# Patient Record
Sex: MALE | Race: WHITE | NOT HISPANIC OR LATINO | Employment: FULL TIME | URBAN - METROPOLITAN AREA
[De-identification: names, ages, dates, MRNs, and addresses within clinical notes are randomized per-mention and may not be internally consistent; named-entity substitution may affect disease eponyms.]

---

## 2017-12-01 ENCOUNTER — OFFICE VISIT - HEALTHEAST (OUTPATIENT)
Dept: FAMILY MEDICINE | Facility: CLINIC | Age: 47
End: 2017-12-01

## 2017-12-01 DIAGNOSIS — J01.90 ACUTE SINUSITIS: ICD-10-CM

## 2017-12-01 DIAGNOSIS — R05.9 COUGH: ICD-10-CM

## 2017-12-01 ASSESSMENT — MIFFLIN-ST. JEOR: SCORE: 1745.3

## 2017-12-03 ENCOUNTER — COMMUNICATION - HEALTHEAST (OUTPATIENT)
Dept: FAMILY MEDICINE | Facility: CLINIC | Age: 47
End: 2017-12-03

## 2019-08-15 ENCOUNTER — OFFICE VISIT - HEALTHEAST (OUTPATIENT)
Dept: FAMILY MEDICINE | Facility: CLINIC | Age: 49
End: 2019-08-15

## 2019-08-15 DIAGNOSIS — Z12.11 SCREEN FOR COLON CANCER: ICD-10-CM

## 2019-08-15 DIAGNOSIS — R22.1 NECK MASS: ICD-10-CM

## 2019-08-15 DIAGNOSIS — R19.5 LOOSE STOOLS: ICD-10-CM

## 2019-08-15 DIAGNOSIS — Z00.00 ROUTINE GENERAL MEDICAL EXAMINATION AT A HEALTH CARE FACILITY: ICD-10-CM

## 2019-08-15 ASSESSMENT — MIFFLIN-ST. JEOR: SCORE: 1758.91

## 2019-08-16 ENCOUNTER — AMBULATORY - HEALTHEAST (OUTPATIENT)
Dept: LAB | Facility: CLINIC | Age: 49
End: 2019-08-16

## 2019-08-16 DIAGNOSIS — Z00.00 ROUTINE GENERAL MEDICAL EXAMINATION AT A HEALTH CARE FACILITY: ICD-10-CM

## 2019-08-16 LAB
CHOLEST SERPL-MCNC: 217 MG/DL
FASTING STATUS PATIENT QL REPORTED: YES
FASTING STATUS PATIENT QL REPORTED: YES
GLUCOSE BLD-MCNC: 85 MG/DL (ref 70–99)
HDLC SERPL-MCNC: 56 MG/DL
LDLC SERPL CALC-MCNC: 144 MG/DL
PSA SERPL-MCNC: 0.4 NG/ML (ref 0–3.5)
TRIGL SERPL-MCNC: 84 MG/DL

## 2019-09-30 ENCOUNTER — RECORDS - HEALTHEAST (OUTPATIENT)
Dept: ADMINISTRATIVE | Facility: OTHER | Age: 49
End: 2019-09-30

## 2019-10-10 ENCOUNTER — RECORDS - HEALTHEAST (OUTPATIENT)
Dept: ADMINISTRATIVE | Facility: OTHER | Age: 49
End: 2019-10-10

## 2019-10-25 ENCOUNTER — RECORDS - HEALTHEAST (OUTPATIENT)
Dept: ADMINISTRATIVE | Facility: OTHER | Age: 49
End: 2019-10-25

## 2020-02-03 ENCOUNTER — OFFICE VISIT - HEALTHEAST (OUTPATIENT)
Dept: FAMILY MEDICINE | Facility: CLINIC | Age: 50
End: 2020-02-03

## 2020-02-03 ENCOUNTER — COMMUNICATION - HEALTHEAST (OUTPATIENT)
Dept: FAMILY MEDICINE | Facility: CLINIC | Age: 50
End: 2020-02-03

## 2020-02-03 ENCOUNTER — COMMUNICATION - HEALTHEAST (OUTPATIENT)
Dept: SCHEDULING | Facility: CLINIC | Age: 50
End: 2020-02-03

## 2020-02-03 DIAGNOSIS — R50.9 FEVER: ICD-10-CM

## 2020-02-03 DIAGNOSIS — J32.9 SINUSITIS, UNSPECIFIED CHRONICITY, UNSPECIFIED LOCATION: ICD-10-CM

## 2020-02-03 LAB
FLUAV AG SPEC QL IA: NORMAL
FLUBV AG SPEC QL IA: NORMAL

## 2020-02-03 ASSESSMENT — MIFFLIN-ST. JEOR: SCORE: 1772.8

## 2020-02-07 ENCOUNTER — COMMUNICATION - HEALTHEAST (OUTPATIENT)
Dept: FAMILY MEDICINE | Facility: CLINIC | Age: 50
End: 2020-02-07

## 2020-10-07 ENCOUNTER — COMMUNICATION - HEALTHEAST (OUTPATIENT)
Dept: FAMILY MEDICINE | Facility: CLINIC | Age: 50
End: 2020-10-07

## 2020-10-07 DIAGNOSIS — B35.1 ONYCHOMYCOSIS: ICD-10-CM

## 2020-10-23 ENCOUNTER — COMMUNICATION - HEALTHEAST (OUTPATIENT)
Dept: FAMILY MEDICINE | Facility: CLINIC | Age: 50
End: 2020-10-23

## 2020-10-23 ENCOUNTER — COMMUNICATION - HEALTHEAST (OUTPATIENT)
Dept: SCHEDULING | Facility: CLINIC | Age: 50
End: 2020-10-23

## 2020-10-26 ENCOUNTER — OFFICE VISIT - HEALTHEAST (OUTPATIENT)
Dept: FAMILY MEDICINE | Facility: CLINIC | Age: 50
End: 2020-10-26

## 2020-10-26 DIAGNOSIS — J02.9 ACUTE PHARYNGITIS, UNSPECIFIED ETIOLOGY: ICD-10-CM

## 2020-10-26 RX ORDER — IBUPROFEN 200 MG
600 TABLET ORAL EVERY 6 HOURS PRN
Status: SHIPPED | COMMUNITY
Start: 2020-10-26

## 2021-05-01 ENCOUNTER — OFFICE VISIT - HEALTHEAST (OUTPATIENT)
Dept: FAMILY MEDICINE | Facility: CLINIC | Age: 51
End: 2021-05-01

## 2021-05-01 DIAGNOSIS — R39.9 UTI SYMPTOMS: ICD-10-CM

## 2021-05-01 DIAGNOSIS — N34.2 URETHRITIS: ICD-10-CM

## 2021-05-01 DIAGNOSIS — R31.29 MICROSCOPIC HEMATURIA: ICD-10-CM

## 2021-05-01 LAB
ALBUMIN UR-MCNC: NEGATIVE G/DL
APPEARANCE UR: CLEAR
BACTERIA #/AREA URNS HPF: ABNORMAL /[HPF]
BILIRUB UR QL STRIP: NEGATIVE
COLOR UR AUTO: YELLOW
GLUCOSE UR STRIP-MCNC: NEGATIVE MG/DL
HGB UR QL STRIP: ABNORMAL
KETONES UR STRIP-MCNC: NEGATIVE MG/DL
LEUKOCYTE ESTERASE UR QL STRIP: NEGATIVE
NITRATE UR QL: NEGATIVE
PH UR STRIP: 7.5 [PH] (ref 5–8)
RBC #/AREA URNS AUTO: ABNORMAL HPF
SP GR UR STRIP: 1.02 (ref 1–1.03)
SQUAMOUS #/AREA URNS AUTO: ABNORMAL LPF
UROBILINOGEN UR STRIP-ACNC: ABNORMAL
WBC #/AREA URNS AUTO: ABNORMAL HPF

## 2021-05-05 ENCOUNTER — COMMUNICATION - HEALTHEAST (OUTPATIENT)
Dept: FAMILY MEDICINE | Facility: CLINIC | Age: 51
End: 2021-05-05

## 2021-05-31 VITALS — BODY MASS INDEX: 28.06 KG/M2 | WEIGHT: 196 LBS | HEIGHT: 70 IN

## 2021-05-31 NOTE — PATIENT INSTRUCTIONS - HE
You can have your fasting appointment tomorrow  Set up your colonoscopy  You can try metamucil daily  You can consider the Shingrix/ shingles vaccine. I recommend checking with insurance for coverage  Follow-up with dermatology to have the neck lesion removed

## 2021-05-31 NOTE — PROGRESS NOTES
Assessment/ Plan     1. Routine general medical examination at a health care facility    Recommend that he remain physically active    Check laboratory testing as noted  - Lipid Cascade; Future  - Glucose; Future  - PSA (Prostatic-Specific Antigen), Annual Screen; Future    Refer for colonoscopy    Recommend he consider the Shingrix/shingles vaccine    2. Screen for colon cancer    Refer for colonoscopy  - Ambulatory referral for Colonoscopy    3. Loose stools-  Likely secondary to irritable bowel syndrome    Recommend increased fiber.  Encourage use of Metamucil  He is setting up his colonoscopy  If having ongoing issues offered referral to gastroenterology    4. Neck mass  Probable cystic structure    Refer to dermatology to have this excised  - Ambulatory referral to Dermatology        Subjective:       Stan Maya is a 49 y.o. male who presents to the clinic for a complete physical examination.  His medical history is generally unremarkable for chronic health conditions.  He turned 49 and therefore made an appointment for an examination.  The past his total cholesterol is 172 with an LDL of 110.  His HDL is 53.    He has had a cystic mass involving the right side of his neck.  This has enlarged recently he would like to have this removed.  This does not cause tenderness.    Review of systems is notable for loose stools.  These typically occur on a consistent basis.  He sometimes feels the need to go back and wipe after he has had a bowel movement.  He denies abdominal pain or bloody stools.  He has not had dark and tarry stools.  There is no family history of inflammatory bowel disease.  He will set up his colonoscopy in the near future.    He drinks a moderate amount of alcohol, 4-6 alcoholic beverages per week on average.  He tolerates physical exertion without difficulty.  Review of systems is negative for headaches,  or hearing concerns, chest pain, urinary concerns, or musculoskeletal issues.  He  "does note that is having some challenges with reading.    The following portions of the patient's history were reviewed and updated as appropriate: allergies, current medications, past family history, past medical history, past social history, past surgical history and problem list. Medications have been reconciled    Review of Systems   A 12 point comprehensive review of systems was negative except as noted.      No current outpatient medications on file.     No current facility-administered medications for this visit.        Objective:      /68   Pulse 60   Temp 98  F (36.7  C)   Resp 16   Ht 5' 10\" (1.778 m)   Wt 199 lb (90.3 kg)   BMI 28.55 kg/m        General appearance: alert, appears stated age and cooperative  Head: Normocephalic, without obvious abnormality, atraumatic  Eyes: conjunctivae/corneas clear. PERRL, EOM's intact.   Ears: normal TM's and external ear canals both ears  Nose: Nares normal. Septum midline. Mucosa normal. No drainage or sinus tenderness.  Throat: lips, mucosa, and tongue normal; teeth and gums normal  Neck: no adenopathy, supple, symmetrical, trachea midline  There is a protuberant subcutaneous mass about the size of a grape involving the right side of his neck  Back: symmetric, no curvature. ROM normal. No CVA tenderness.  Lungs: clear to auscultation bilaterally  Heart: regular rate and rhythm, S1, S2 normal, no murmur, click, rub or gallop  Abdomen: soft, non-tender; bowel sounds normal; no masses,  no organomegaly  Genitourinary: Penis is circumcised, no scrotal masses, no inguinal hernia  Rectal: Normal sphincter tone, prostate smooth and symmetric  Extremities: extremities normal, atraumatic, no cyanosis or edema  Skin: Skin color, texture, turgor normal.   Lymph nodes: Cervical nodes normal.  Neurologic: Alert and oriented X 3         No results found for this or any previous visit (from the past 168 hour(s)).       This note has been dictated using voice " recognition software. Any grammatical or context distortions are unintentional and inherent to the software

## 2021-06-03 VITALS — HEIGHT: 70 IN | BODY MASS INDEX: 28.49 KG/M2 | WEIGHT: 199 LBS

## 2021-06-04 VITALS
BODY MASS INDEX: 28.93 KG/M2 | HEIGHT: 70 IN | TEMPERATURE: 99.5 F | SYSTOLIC BLOOD PRESSURE: 140 MMHG | RESPIRATION RATE: 16 BRPM | HEART RATE: 76 BPM | WEIGHT: 202.06 LBS | OXYGEN SATURATION: 99 % | DIASTOLIC BLOOD PRESSURE: 68 MMHG

## 2021-06-05 VITALS
SYSTOLIC BLOOD PRESSURE: 119 MMHG | HEART RATE: 59 BPM | RESPIRATION RATE: 20 BRPM | DIASTOLIC BLOOD PRESSURE: 80 MMHG | WEIGHT: 199.5 LBS | BODY MASS INDEX: 28.63 KG/M2 | OXYGEN SATURATION: 98 %

## 2021-06-05 NOTE — TELEPHONE ENCOUNTER
----- Message from Shannon Butler MD sent at 2/3/2020 10:49 AM CST -----  Normal influenza swab    Thanks    BB

## 2021-06-05 NOTE — TELEPHONE ENCOUNTER
Thanks for the message - I would recommend continuing the antibiotics and letting me know how he feels on Monday - sorry that he is not feeling well!  He is being appropriately treated for sinusitis and pneumonia.  If he does have influenza (we were thinking maybe a false negative swab) it will likely take a few more days to improve.    Thanks    BB

## 2021-06-05 NOTE — TELEPHONE ENCOUNTER
Patient Returning Call  Reason for call:  Returning call  Information relayed to patient:    Relayed below information to patient.  Patient has additional questions:  No  If YES, what are your questions/concerns:    No additional questions at this time.  Okay to leave a detailed message?: No call back needed

## 2021-06-05 NOTE — PROGRESS NOTES
Assessment/Plan:    Stan Maya is a 49 y.o. male presenting for:    1. Fever  - Influenza A/B Rapid Test- Nasal Swab    2. Sinusitis, unspecified chronicity, unspecified location  The patient seems moderately ill.  Influenza swab was negative but I would not be surprised if this is a false negative.  Regardless, he is 9 days out from symptom onset and would not be a candidate for Tamiflu.  He has had sinus symptoms for about 7 to 8 days and is having some mild shortness of breath with crackles in his right lower lobe indicating a potential pneumonia.  We did discuss doing a chest x-ray however I feel it is reasonable to treat for his sinus infection and we will use doxycycline to double cover for pneumonia.  If he does not tolerate the doxycycline we can certainly have him come back for chest x-ray and to simply treat for sinus infection if chest x-ray is unremarkable.    Did offer cough syrup with codeine which she declined at this time.  He will contact me if he has any further questions or concerns.    Oxygen saturation is normal today.  I discussed with the patient that if he starts to get worse he needs to be seen in follow-up.    - doxycycline (VIBRAMYCIN) 100 MG capsule; Take 1 capsule (100 mg total) by mouth 2 (two) times a day for 10 days.  Dispense: 20 capsule; Refill: 0        There are no discontinued medications.        Chief Complaint:  Chief Complaint   Patient presents with     Influenza     Flu like sxs x 9 days     Fever     Cough       Subjective:   Stan Maya is a pleasant 49-year-old gentleman presenting to the clinic today with concerns over possible influenza and sinusitis.  Patient states that about 9 to 10 days ago he had acute onset of some body aches and chills.  This continued for a day and then he began to have sinus symptoms.  He notes intense sinus congestion particularly in the maxillary sinuses bilaterally.  He notes he has had continued low-grade fevers  "throughout the day and significant sweating at night.  He notes a cough which is coming in spasms and causes shortness of breath.  Other than when he is coughing he does not feel overly short of breath.    He has not had any sick contacts recently that he is aware of.  He states that his children had similar symptoms back about a month and a half ago but nothing more recently.    He is coughing up some sputum and sometimes it is tinged with blood.    12 point review of systems completed and negative except for what has been described above    Social History     Tobacco Use   Smoking Status Never Smoker   Smokeless Tobacco Never Used       Current Outpatient Medications   Medication Sig     doxycycline (VIBRAMYCIN) 100 MG capsule Take 1 capsule (100 mg total) by mouth 2 (two) times a day for 10 days.         Objective:  Vitals:    02/03/20 0846   BP: 140/68   Pulse: 76   Resp: 16   Temp: 99.5  F (37.5  C)   TempSrc: Oral   SpO2: 99%   Weight: 202 lb 1 oz (91.7 kg)   Height: 5' 10\" (1.778 m)       Body mass index is 28.99 kg/m .    Vital signs reviewed and stable  General: No acute distress  Psych: Appropriate affect  HEENT: moist mucous membranes, pupils equal, round, reactive to light and accomodation, posterior oropharynx clear of erythema or exudate, tympanic membranes are pearly grey bilaterally  Lymph: no cervical or supraclavicular lymphadenopathy  Cardiovascular: regular rate and rhythm with no murmur  Pulmonary: clear to auscultation bilaterally with no wheeze  Abdomen: soft, non tender, non distended with normo-active bowel sounds  Extremities: warm and well perfused with no edema  Skin: warm and dry with no rash         This note has been dictated and transcribed using voice recognition software.   Any errors in transcription are unintentional and inherent to the software.  "

## 2021-06-05 NOTE — TELEPHONE ENCOUNTER
RN Triage:    Spoke with 49 yr old Stan who c/o:    7-8 days ago developed body aches and nasal congestion.    Headache off and on.    Taking Advil as needed and Advil Cold.    Using Zycam.    Stayed home 4 days ago.    Last night reports continuous coughing, coughing streaks of blood in green phlegm.    Denies difficulty breathing but catches his breath with coughing spells.    Last 3-4 nights has woken up with sweats, but has not have a thermometer.    PLAN:  Advised OV today per protocol.  Pt scheduled for 8:40 am today with Dr. Butler at the [facility].  Care advice given per influenza protocol.  Pt voiced understanding.    Hortencia Coughlin RN   [facility] RN Triage    Reason for Disposition    Fever present > 3 days (72 hours)    Protocols used: INFLUENZA - SEASONAL-A-OH

## 2021-06-05 NOTE — TELEPHONE ENCOUNTER
Left message to call back for: flu swab results  Information to relay to patient:  Normal/negative influenza swab.  Please document call was returned.

## 2021-06-05 NOTE — TELEPHONE ENCOUNTER
Question following Office Visit  When did you see your provider: 02/03/2020  What is your question: Patient states he saw provider for fever,Influenza on 02/03/2020 patient is on doxycycline one capsule by mouth twice daily patient still have symptoms of fever during night and cough with greenish fleam patient is requesting providers suggestions. Please advise .  Okay to leave a detailed message: No

## 2021-06-12 NOTE — TELEPHONE ENCOUNTER
Reason for Disposition    [1] Caller requesting NON-URGENT health information AND [2] PCP's office is the best resource    Additional Information    Negative: RN needs further essential information from caller in order to complete triage    Negative: Requesting regular office appointment    Protocols used: INFORMATION ONLY CALL-A-AH

## 2021-06-12 NOTE — TELEPHONE ENCOUNTER
OK to put him on for a virtual visit this afternoon at 2:20 if that works. I can call him at a different time if that works.

## 2021-06-12 NOTE — TELEPHONE ENCOUNTER
New Appointment Needed  What is the reason for the visit:    Same Date/Next Day Appt Request  What is the reason for your visit?: Patient has a trep test and a COVID test done at the [facility]. The strep test has come back negative but still awaiting the COVID results. The patient is suffering from a severe sore throat and would like Dr. Castillo to call the patient and give advice on what the patient should do for the weekend.    Provider Preference: PCP only  How soon do you need to be seen?: today  Waitlist offered?: No  Okay to leave a detailed message:  Yes

## 2021-06-12 NOTE — TELEPHONE ENCOUNTER
Pt calling back to check on the status of the message he had sent to PCP regarding his throat pain.     Pt did a [facility] visit yesterday and then went to a [facility] clinic for a strep and COVID test.  Strep negative, COVID pending.      Pt questions what he can do for his severe throat pain.      Education provided including OTC pain medication and drinking cold/warm fluids per preference.  Advised virtual UC visit or in-person visit (if cleared by UC staff) for any new/worsening symptoms.      Patient verbalized understanding and had no further questions.  He plans to see how he feels in the AM and will call back if needed.      Addie Loo RN, FNA

## 2021-06-12 NOTE — PROGRESS NOTES
"Stan Maya is a 50 y.o. male who is being evaluated via a billable video visit.      The patient has been notified of following:     \"This video visit will be conducted via a call between you and your physician/provider. We have found that certain health care needs can be provided without the need for an in-person physical exam.  This service lets us provide the care you need with a video conversation.  If a prescription is necessary we can send it directly to your pharmacy.  If lab work is needed we can place an order for that and you can then stop by our lab to have the test done at a later time.    Video visits are billed at different rates depending on your insurance coverage. Please reach out to your insurance provider with any questions.    If during the course of the call the physician/provider feels a video visit is not appropriate, you will not be charged for this service.\"    Patient has given verbal consent to a Video visit? Yes  How would you like to obtain your AVS? AVS Preference: Mail a copy.  If dropped by the video visit, the video invitation should be sent to: Text to cell phone: [phone]   Will anyone else be joining your video visit? No        Video Start Time: 4:30     Assessment/ Plan     1. Acute pharyngitis, unspecified etiology    This likely represents a viral pharyngitis  He had recent negative Covid testing and a negative rapid strep test  This is challenging to assess via video visit.  Did discuss the differential including peritonsillar abscess.  He has not noticed any obvious asymmetry in the oropharynx area  Thrush or a herpes simplex infection seem less likely    Given his recent negative rapid strep test do not recommend treatment with antibiotics at this time  He has had negative Covid testing as noted  Recommend symptomatic treatment including anti-inflammatory medication as needed.  Can consider salt water gargles  Recommend monitoring for ongoing " improvement  Recommend that patient contact this clinician if having ongoing symptoms  Would consider empiric treatment with antibiotics  If developing a fever, difficulty swallowing, or more severe pain then recommend an in person evaluation  Reviewed potential concerns for peritonsillar abscess which would require an ENT referral    Subjective:       Stan Maya is a 50 y.o. male who presents via a video visit during the COVID-19 pandemic.  The primary concern has been sore throat symptoms over the past week.  He did get tested for Covid with a negative test.  He also had a negative rapid strep test.  He reports he has had symptoms for approximately 1 week.  He has more significant symptoms on the left side of his throat and neck area.  He denies fever or chills.  He has not had a loss of sense of smell or taste.  He has not has had again difficulty swallowing but does have some pain with swallowing.  He actually feels there has been some improvement over the past day.    Review of systems otherwise negative for a cough, shortness of breath, abdominal pain, or a white coating on his tongue.  He has not had obvious blisters in his mouth as far as he knows.    The following portions of the patient's history were reviewed and updated as appropriate: allergies, current medications, past family history, past medical history, past social history, past surgical history and problem list. Medications have been reconciled    Review of Systems   A 12 point comprehensive review of systems was negative except as noted.      Current Outpatient Medications   Medication Sig Dispense Refill     ibuprofen (ADVIL,MOTRIN) 200 MG tablet Take 600 mg by mouth every 6 (six) hours as needed for pain.       ciclopirox (PENLAC) 8 % solution Apply over nail and surrounding skin. Apply daily over previous coat. After seven (7) days, may remove with alcohol and continue cycle. 6.6 mL 0     No current facility-administered medications  for this visit.        Objective:      There were no vitals taken for this visit.    Examination not performed as this is a telephone visit  General: Does not sound acutely distressed  There is no audible wheezing  Speech is clear  Judgment and insight are intact    No results found for this or any previous visit (from the past 168 hour(s)).       This note has been dictated using voice recognition software. Any grammatical or context distortions are unintentional and inherent to the software    Ignacio Castillo MD      Additional provider notes: GENERAL: Healthy, alert and no distress  EYES: Eyes grossly normal to inspection. No discharge or erythema, or obvious scleral/conjunctival abnormalities.  RESP: No audible wheeze, cough, or visible cyanosis.  No visible retractions or increased work of breathing.    NEURO: Cranial nerves grossly intact. Mentation and speech appropriate for age.  PSYCH: Mentation appears normal, affect normal/bright, judgement and insight intact, normal speech and appearance well-groomed       Video-Visit Details    Type of service:  Video Visit    Video End Time (time video stopped): 4:38  Originating Location (pt. Location): Home    Distant Location (provider location):  [facility]     Platform used for Video Visit: Rosalio Castillo MD  Not Applicable        Ignacio Castillo MD

## 2021-06-12 NOTE — TELEPHONE ENCOUNTER
Dr. Blountarity-  Ok to use one of your holds today for this pt? He has sent several messages stating that he would like to discuss his severe sore throat with you. Please advise.

## 2021-06-14 NOTE — PROGRESS NOTES
"Assessment/Plan:         Visit Diagnoses     Acute sinusitis    -  Primary    Relevant Medications    azithromycin (ZITHROMAX) 500 MG tablet        Patient Instructions   1) Zithromax 500 mg daily for 5 days.  2) Confirmatory strep test is pending.  3) Follow up in 10 days if not improving, sooner if worsening or other concerns.        Subjective:   Stan Maya is a 47 y.o. male who presents today complaining of cough for 3 weeks.  He also has a headache with the symptoms.  He would like to be tested for strep as he has had that with similar sympotms.  He initially had a URI symptoms that were worse but symptoms have not cleared.  He had fever initially but that has resolved.  No ear pain.  His family members have also been ill but they have all improved.  No chronic lung disease and no tobacco use.      Patient Active Problem List   Diagnosis     Keloid     Onychomycosis        No past medical history on file.     Past Surgical History:   Procedure Laterality Date     inguinal hernia       VASECTOMY          No current outpatient prescriptions on file.      Review of Systems  See HPI     Objective:     Vitals:    12/01/17 1003   BP: 116/66   Patient Site: Left Arm   Patient Position: Sitting   Cuff Size: Adult Regular   Pulse: (!) 59   Resp: 12   Temp: 98.5  F (36.9  C)   TempSrc: Oral   SpO2: 98%   Weight: 196 lb (88.9 kg)   Height: 5' 10\" (1.778 m)        Physical Exam   Constitutional: He appears well-developed and well-nourished. No distress.   HENT:   Right Ear: Tympanic membrane and ear canal normal.   Left Ear: Tympanic membrane and ear canal normal.   Nose: Right sinus exhibits no maxillary sinus tenderness and no frontal sinus tenderness. Left sinus exhibits no maxillary sinus tenderness and no frontal sinus tenderness.   Mouth/Throat: Oropharynx is clear and moist.   Eyes: Conjunctivae are normal.   Neck: Normal range of motion. Neck supple.   Cardiovascular: Normal rate and regular rhythm.  "   No murmur heard.  Pulmonary/Chest: Effort normal and breath sounds normal. No respiratory distress. He has no wheezes. He has no rales.   Lymphadenopathy:     He has no cervical adenopathy.   Skin: No rash noted.        Group A Strep, RNA Direct Detection, Throat   Result Value Ref Range    Group A Strep by PCR No Group A Strep rRNA detected No Group A Strep rRNA detected

## 2021-06-17 NOTE — TELEPHONE ENCOUNTER
Telephone Encounter by Hortencia Carey LPN at 10/7/2020  2:34 PM     Author: Hortencia Carey LPN Service: -- Author Type: Licensed Nurse    Filed: 10/7/2020  2:35 PM Encounter Date: 10/7/2020 Status: Signed    : Hortencia Carey LPN (Licensed Nurse)       Medication Request  ciclopirox 8 % Apply over nail and surrounding skin. Apply daily over previous coat. After seven (7) days, may remove with alcohol and continue cycle.    [facility]ed [facility]acy: [facility]  Reason for request: toenail fungus  Okay to leave a detailed message: yes

## 2021-06-18 NOTE — PATIENT INSTRUCTIONS - HE
Patient Instructions by Qasim Barragan PA-C at 5/1/2021  9:20 AM     Author: Qasim Barragan PA-C Service: -- Author Type: Physician Assistant    Filed: 5/1/2021 10:27 AM Encounter Date: 5/1/2021 Status: Addendum    : Qasim Barragan PA-C (Physician Assistant)    Related Notes: Original Note by Qasim Barragan PA-C (Physician Assistant) filed at 5/1/2021 10:26 AM       Increase fluids, drink to thirst.  Take the Flomax as written.  Antibiotic as written.  Follow-up with your primary care provider next week.        Patient Education     Symptoms With Uncertain Cause (Adult)  You have been examined, and tests may have been done. However, the exact cause of your symptoms is still not certain. Watch for any new symptoms or worsening of your condition. Another exam or more testing at a later time may be needed. Unless told otherwise, you can go back to your normal routine. Continue to take prescribed medicines as directed. Contact your healthcare provider if you have questions or concerns.   Follow-up care  Follow up with your healthcare provider if your symptoms do not begin to improve in the next few days, or as advised by our staff.   When to seek medical advice  Call your healthcare provider if your symptoms get worse or if new symptoms appear.  Date Last Reviewed: 10/1/2017    9172-2904 [employer]. [address] [place] [address]. All rights reserved. This information is not intended as a substitute for professional medical care. Always follow your healthcare professional's instructions.           Patient Education     Urethritis, Infection vs. Chemical (Adult Male)  You have urethritis. This means an inflammation in your urethra. The urethra is the tube that drains the urine out of your bladder through the tip of the penis. Urethritis is most often caused by a bacterial infection. The infection may be from gonorrhea, chlamydia, or another sexually transmitted disease (STD). But other things  can also cause it. These things include irritation from soap, lotion, deodorant, or spermicides. The cause of your urethritis is uncertain.  Women with urethritis often don't have symptoms. Men are more likely to have symptoms. Symptoms can start within 1 week to a month or more after infection. Symptoms can include:    Burning or pain when urinating    Your penis feels irritated    Pus coming from your penis    Pain and possible swelling in one or both testicles  Urethritis caused by bacteria is treated with antibiotics. Urethritis may clear up in a few weeks or months, even without treatment. But if you don't get treatment, the bacteria that cause the infection can stay in the urethra. Even if symptoms go away, you can still have the infection. And you can spread it to others.  Your sex partner or partners also need to be treated. This is true even if they have no symptoms. You can get infected again if they aren't treated and you have sex with them. Your partner should call his or her healthcare provider to be looked at and treated.  Your urethritis may also be caused by things other than bacteria. These causes include:    Chemical irritation from a product used in the genital area. This might be soap, lotions, deodorant, or spermicides. Symptoms usually get better within 3 days after the last time you used the product.    Damage to the urethra caused by vigorous sex or masturbation    Damage to the urethra caused by inserting an object into it. This could happen during an operation in the hospital. A thin, plastic tube (catheter) is put into your bladder to let urine to drain from the bladder during the operation.    Long-term (chronic) urethritis that lasts for weeks or months, or goes away and comes back. This kind of urethritis may be caused by a narrowed urethra or an untreated bacterial infection. You may need to see a specialist for diagnosis and treatment.  Home care  The following guidelines will help  you care for yourself at home:    Stop using any soap, lotions, or other chemicals that may cause irritation.    If you were given antibiotics, take them until they are all gone, or until your healthcare provider tells you to stop. It's important to finish the antibiotics even if your symptoms go away. This is to make sure the infection has completely cleared up.    Don't have sex until both you and your partner have finished all of the antibiotics, and your provider tells you that you cannot pass on the infection.    You can take acetaminophen or ibuprofen for pain, unless you were given a different pain medicine to use. If you have chronic liver or kidney disease, talk with your provider before taking these medicines. Also talk with your provider if you've had a stomach ulcer or GI bleeding or are taking blood thinner medicines.    Dont give aspirin to anyone under 18 years of age who is ill with a fever. It may cause severe liver damage.    Learn about safe sex practices and use them. The safest sex is with a partner who does not have an STD and only has sex with you. Condoms can keep you from getting some STDs. These include gonorrhea, chlamydia, and HIV. But condoms are not a guarantee you will not get these diseases.  Follow-up care  Follow up with your healthcare provider, or as advised. If an STD culture was taken, call as directed for the result. If you are diagnosed with an STD, follow up with your provider or your local health department. You should have a complete STD screening, including HIV testing. For more information, call the CDC-INFO at 678-448-9252.  When to seek medical advice  Call your healthcare provider right away if any of these occur:    You don't start to get better after 3 days of treatment.    You cant urinate because of the pain    Rash or joint pain    Painful sores on the penis    Enlarged, painful lumps (lymph nodes) in your groin    Testicle pain or your scrotum swells   Date Last  Reviewed: 10/1/2016    0980-6892 [employer]. [address] [place] [address], [place] [address]. All rights reserved. This information is not intended as a substitute for professional medical care. Always follow your healthcare professional's instructions.           Patient Education     Blood in Urine (Hematuria)  Blood in your urine is called hematuria. Most of the time, the cause is not serious. But you should never ignore blood in the urine. Your healthcare provider can evaluate you to find the cause of the bleeding and treat it, if needed.  Types of hematuria    Gross hematuria. This means that the blood can be seen by the naked eye. The urine may look pinkish, brownish, or bright red.    Microscopic hematuria. This means that the urine is clear, but blood cells can be seen when urine is looked at under a microscope or tested in a lab.  Both types of hematuria can have the same causes. Neither one is more serious than the other. With either type, you may not have any other symptoms at all. Or you may have other symptoms such as:    Pain, pressure, or burning when you urinate    Belly pain    Back pain  No matter how much blood is found, the cause of the bleeding needs to be identified.  What causes hematuria?  Causes of hematuria vary. They include things such as:    Injury    Strenuous exercise    Infection of the bladder, kidney, or prostate    Menstruation  Other reasons people may have blood in their urine are more serious. They include:       Blood-clotting disorders    Bladder or kidney cancer    Sickle cell disease    Inflammation of the kidney, urethra, bladder, or prostate  Many treatments are available for blood in the urine, depending on the cause.  Diagnosing hematuria  Your healthcare provider will first confirm that blood is in your urine. He or she will also take your health history and give you a physical exam. Then other tests are done to find exactly where the blood is coming from and  why. Your provider will decide which tests will best figure out the cause of your hematuria. These are some common tests that may be done:    Lab tests (may include urinalysis, a urine culture, a urine cytology, and blood tests)    Cystoscopy    CT or CT urography    MRI or MR urography    Ultrasound of the kidney    Kidney biopsy  Date Last Reviewed: 6/1/2019 2000-2019 [employer]. [address] [place] [address]. All rights reserved. This information is not intended as a substitute for professional medical care. Always follow your healthcare professional's instructions.

## 2021-06-30 ENCOUNTER — OFFICE VISIT - HEALTHEAST (OUTPATIENT)
Dept: FAMILY MEDICINE | Facility: CLINIC | Age: 51
End: 2021-06-30

## 2021-06-30 DIAGNOSIS — B35.1 ONYCHOMYCOSIS: ICD-10-CM

## 2021-06-30 DIAGNOSIS — Z00.00 ROUTINE GENERAL MEDICAL EXAMINATION AT A HEALTH CARE FACILITY: ICD-10-CM

## 2021-06-30 DIAGNOSIS — H91.93 HEARING DECREASED, BILATERAL: ICD-10-CM

## 2021-06-30 DIAGNOSIS — E78.49 OTHER HYPERLIPIDEMIA: ICD-10-CM

## 2021-06-30 DIAGNOSIS — N50.82 SCROTUM PAIN: ICD-10-CM

## 2021-06-30 LAB
ANION GAP SERPL CALCULATED.3IONS-SCNC: 11 MMOL/L (ref 5–18)
BUN SERPL-MCNC: 15 MG/DL (ref 8–22)
CALCIUM SERPL-MCNC: 9.1 MG/DL (ref 8.5–10.5)
CHLORIDE BLD-SCNC: 107 MMOL/L (ref 98–107)
CHOLEST SERPL-MCNC: 201 MG/DL
CO2 SERPL-SCNC: 23 MMOL/L (ref 22–31)
CREAT SERPL-MCNC: 1.05 MG/DL (ref 0.7–1.3)
FASTING STATUS PATIENT QL REPORTED: YES
GFR SERPL CREATININE-BSD FRML MDRD: >60 ML/MIN/1.73M2
GLUCOSE BLD-MCNC: 90 MG/DL (ref 70–125)
HDLC SERPL-MCNC: 53 MG/DL
HIV 1+2 AB+HIV1 P24 AG SERPL QL IA: NEGATIVE
LDLC SERPL CALC-MCNC: 127 MG/DL
POTASSIUM BLD-SCNC: 4.3 MMOL/L (ref 3.5–5)
PSA SERPL-MCNC: 0.4 NG/ML (ref 0–3.5)
SODIUM SERPL-SCNC: 141 MMOL/L (ref 136–145)
TRIGL SERPL-MCNC: 106 MG/DL

## 2021-06-30 RX ORDER — CICLOPIROX 80 MG/ML
SOLUTION TOPICAL
Qty: 6.6 ML | Refills: 3 | Status: SHIPPED | OUTPATIENT
Start: 2021-06-30 | End: 2021-09-28

## 2021-06-30 ASSESSMENT — MIFFLIN-ST. JEOR: SCORE: 1779.77

## 2021-06-30 NOTE — PROGRESS NOTES
Progress Notes by Qasim Barragan PA-C at 5/1/2021  9:20 AM     Author: Qasim Barragan PA-C Service: -- Author Type: Physician Assistant    Filed: 5/2/2021  2:45 PM Encounter Date: 5/1/2021 Status: Signed    : Qasim Barragan PA-C (Physician Assistant)       Chief Complaint   Patient presents with   ? Urinary Tract Infection     frquency urination.          Clinical Decision Making:  Multiple etiologies and diagnoses were considered to include, but not limited to, urinary tract infection, urethritis, Ureaplasma, urethral tear, STDs, prostatitis, BPH.  Patient has not had a previous history of nephrolithiasis and no risk factors or symptoms for diabetes .  He has had no gross hematuria or other constitutional symptoms, but he did have noted microscopic hematuria.  Patient is placed on Flomax and Cipro to cover for the most likely diagnosis of his symptoms.  He will be monitored for tincture of time and see how his symptoms resolve with treatment or if new symptoms declare themselves.  Patient will have close follow-up for reevaluation and treatment.  Advised the patient that we will treat him with an antibiotic that should cover for urethritis    30 min spent on the date of the encounter in chart review, patient visit, review of tests, documentation and/or discussion with other providers about the issues documented above.     At the end of the encounter, I discussed results, diagnosis, medications. Discussed red flags for immediate return to clinic/ER, as well as indications for follow up if no improvement. Patient understood and agreed to plan. Patient was stable for discharge.    1. Urethritis  tamsulosin (FLOMAX) 0.4 mg cap    ciprofloxacin HCl (CIPRO) 500 MG tablet   2. UTI symptoms  Urinalysis-UC if Indicated   3. Microscopic hematuria  tamsulosin (FLOMAX) 0.4 mg cap    ciprofloxacin HCl (CIPRO) 500 MG tablet         Patient Instructions   Increase fluids, drink to thirst.  Take the Flomax as  written.  Antibiotic as written.  Follow-up with your primary care provider next week.        Patient Education     Symptoms With Uncertain Cause (Adult)  You have been examined, and tests may have been done. However, the exact cause of your symptoms is still not certain. Watch for any new symptoms or worsening of your condition. Another exam or more testing at a later time may be needed. Unless told otherwise, you can go back to your normal routine. Continue to take prescribed medicines as directed. Contact your healthcare provider if you have questions or concerns.   Follow-up care  Follow up with your healthcare provider if your symptoms do not begin to improve in the next few days, or as advised by our staff.   When to seek medical advice  Call your healthcare provider if your symptoms get worse or if new symptoms appear.  Date Last Reviewed: 10/1/2017    1646-8104 [employer]. [address] [place] [address], [place] [address]. All rights reserved. This information is not intended as a substitute for professional medical care. Always follow your healthcare professional's instructions.           Patient Education     Urethritis, Infection vs. Chemical (Adult Male)  You have urethritis. This means an inflammation in your urethra. The urethra is the tube that drains the urine out of your bladder through the tip of the penis. Urethritis is most often caused by a bacterial infection. The infection may be from gonorrhea, chlamydia, or another sexually transmitted disease (STD). But other things can also cause it. These things include irritation from soap, lotion, deodorant, or spermicides. The cause of your urethritis is uncertain.  Women with urethritis often don't have symptoms. Men are more likely to have symptoms. Symptoms can start within 1 week to a month or more after infection. Symptoms can include:    Burning or pain when urinating    Your penis feels irritated    Pus coming from your penis    Pain and  possible swelling in one or both testicles  Urethritis caused by bacteria is treated with antibiotics. Urethritis may clear up in a few weeks or months, even without treatment. But if you don't get treatment, the bacteria that cause the infection can stay in the urethra. Even if symptoms go away, you can still have the infection. And you can spread it to others.  Your sex partner or partners also need to be treated. This is true even if they have no symptoms. You can get infected again if they aren't treated and you have sex with them. Your partner should call his or her healthcare provider to be looked at and treated.  Your urethritis may also be caused by things other than bacteria. These causes include:    Chemical irritation from a product used in the genital area. This might be soap, lotions, deodorant, or spermicides. Symptoms usually get better within 3 days after the last time you used the product.    Damage to the urethra caused by vigorous sex or masturbation    Damage to the urethra caused by inserting an object into it. This could happen during an operation in the hospital. A thin, plastic tube (catheter) is put into your bladder to let urine to drain from the bladder during the operation.    Long-term (chronic) urethritis that lasts for weeks or months, or goes away and comes back. This kind of urethritis may be caused by a narrowed urethra or an untreated bacterial infection. You may need to see a specialist for diagnosis and treatment.  Home care  The following guidelines will help you care for yourself at home:    Stop using any soap, lotions, or other chemicals that may cause irritation.    If you were given antibiotics, take them until they are all gone, or until your healthcare provider tells you to stop. It's important to finish the antibiotics even if your symptoms go away. This is to make sure the infection has completely cleared up.    Don't have sex until both you and your partner have  finished all of the antibiotics, and your provider tells you that you cannot pass on the infection.    You can take acetaminophen or ibuprofen for pain, unless you were given a different pain medicine to use. If you have chronic liver or kidney disease, talk with your provider before taking these medicines. Also talk with your provider if you've had a stomach ulcer or GI bleeding or are taking blood thinner medicines.    Dont give aspirin to anyone under 18 years of age who is ill with a fever. It may cause severe liver damage.    Learn about safe sex practices and use them. The safest sex is with a partner who does not have an STD and only has sex with you. Condoms can keep you from getting some STDs. These include gonorrhea, chlamydia, and HIV. But condoms are not a guarantee you will not get these diseases.  Follow-up care  Follow up with your healthcare provider, or as advised. If an STD culture was taken, call as directed for the result. If you are diagnosed with an STD, follow up with your provider or your local health department. You should have a complete STD screening, including HIV testing. For more information, call the CDC-INFO at 240-473-6550.  When to seek medical advice  Call your healthcare provider right away if any of these occur:    You don't start to get better after 3 days of treatment.    You cant urinate because of the pain    Rash or joint pain    Painful sores on the penis    Enlarged, painful lumps (lymph nodes) in your groin    Testicle pain or your scrotum swells   Date Last Reviewed: 10/1/2016    6153-9891 [employer]. [address] [place] [address], [place] [address]. All rights reserved. This information is not intended as a substitute for professional medical care. Always follow your healthcare professional's instructions.           Patient Education     Blood in Urine (Hematuria)  Blood in your urine is called hematuria. Most of the time, the cause is not serious. But you  should never ignore blood in the urine. Your healthcare provider can evaluate you to find the cause of the bleeding and treat it, if needed.  Types of hematuria    Gross hematuria. This means that the blood can be seen by the naked eye. The urine may look pinkish, brownish, or bright red.    Microscopic hematuria. This means that the urine is clear, but blood cells can be seen when urine is looked at under a microscope or tested in a lab.  Both types of hematuria can have the same causes. Neither one is more serious than the other. With either type, you may not have any other symptoms at all. Or you may have other symptoms such as:    Pain, pressure, or burning when you urinate    Belly pain    Back pain  No matter how much blood is found, the cause of the bleeding needs to be identified.  What causes hematuria?  Causes of hematuria vary. They include things such as:    Injury    Strenuous exercise    Infection of the bladder, kidney, or prostate    Menstruation  Other reasons people may have blood in their urine are more serious. They include:       Blood-clotting disorders    Bladder or kidney cancer    Sickle cell disease    Inflammation of the kidney, urethra, bladder, or prostate  Many treatments are available for blood in the urine, depending on the cause.  Diagnosing hematuria  Your healthcare provider will first confirm that blood is in your urine. He or she will also take your health history and give you a physical exam. Then other tests are done to find exactly where the blood is coming from and why. Your provider will decide which tests will best figure out the cause of your hematuria. These are some common tests that may be done:    Lab tests (may include urinalysis, a urine culture, a urine cytology, and blood tests)    Cystoscopy    CT or CT urography    MRI or MR urography    Ultrasound of the kidney    Kidney biopsy  Date Last Reviewed: 6/1/2019 2000-2019 The [employer]. [address] [place] [address]  [place] [address]. All rights reserved. This information is not intended as a substitute for professional medical care. Always follow your healthcare professional's instructions.                HPI:  Stan Maya is a 51 y.o. male who presents today for a 5-day history of irritation burning and discomfort in the midshaft of the urethra.  Patient denies any distal end of the urethra or the urethral meatus with irritation redness or discharge.  She is  in a monogamous relationship and has no concerns for STDs.  He has not had penile discharge no pain or swelling of the scrotum or testicles and no lesions in the inguinal or scrotal region.  Further, patient states he does not have routine nocturia.  He has had decreased urinary stream at the onset and at the end with dribbling.  Patient does remark that the force of the stream has decreased over the last 5 days.  No recent trauma, no hematuria, fever chills night sweats fatigue or CVA tenderness or pain no prior history of nephrolithiasis.    History obtained from chart review and the patient.    Patient has not tried treatment for this at home.    Problem List:  2016-12: Keloid  2016-12: Onychomycosis      No past medical history on file.    Social History     Tobacco Use   ? Smoking status: Never Smoker   ? Smokeless tobacco: Never Used   Substance Use Topics   ? Alcohol use: Yes     Comment: Occasional       Review of Systems  As above in HPI otherwise negative.    Vitals:    05/01/21 0957   BP: 119/80   Patient Site: Right Arm   Patient Position: Sitting   Cuff Size: Adult Regular   Pulse: (!) 59   Resp: 20   SpO2: 98%   Weight: 199 lb 8 oz (90.5 kg)       Physical Exam    General: Patient is resting comfortably no acute distress is afebrile  HEENT: Head is normocephalic atraumatic   eyes are PERRL EOMI sclera anicteric   TMs are clear bilaterally  Throat is with mild pharyngeal wall erythema and no exudate  No cervical lymphadenopathy  present  LUNGS: Clear to auscultation bilaterally  HEART: Regular rate and rhythm  Abdomen: Nontender nondistended no rebound or guarding no masses no suprapubic tenderness to palpation no induration or distention.  No pain at McBurney's point.  No CVA tenderness to percussion.  Skin: Without rash non-diaphoretic capillary refill is brisk at less than 2 seconds.  Mucous membranes are moist.  : Rectal and prostate exam were deferred.    Labs:  Recent Results (from the past 72 hour(s))   Urinalysis-UC if Indicated   Result Value Ref Range    Color, UA Yellow Colorless, Yellow, Straw, Light Yellow    Clarity, UA Clear Clear    Glucose, UA Negative Negative    Protein, UA Negative Negative    Bilirubin, UA Negative Negative    Urobilinogen, UA 0.2 E.U./dL 0.2 E.U./dL, 1.0 E.U./dL    pH, UA 7.5 5.0 - 8.0    Blood, UA Trace (!) Negative    Ketones, UA Negative Negative    Nitrite, UA Negative Negative    Leukocytes, UA Negative Negative    Specific Gravity, UA 1.020 1.005 - 1.030    RBC, UA 3-5 (!) None Seen, 0-2 hpf    WBC, UA 0-5 None Seen, 0-5 hpf    Bacteria, UA Few (!) None Seen    Squam Epithel, UA 0-5 None Seen, 0-5 lpf     Urine is sent for culture.

## 2021-07-01 LAB — HCV AB SERPL QL IA: NEGATIVE

## 2021-07-04 NOTE — PROGRESS NOTES
Progress Notes by Ignacio Castillo MD at 6/30/2021  7:00 AM     Author: Ignacio Castillo MD Service: -- Author Type: Physician    Filed: 6/30/2021  4:40 PM Encounter Date: 6/30/2021 Status: Signed    : Ignacio Castillo MD (Physician)       MALE PREVENTATIVE EXAM    Assessment and Plan:     Patient has been advised of split billing requirements and indicates understanding: Yes    1. Routine general medical examination at a health care facility    Recommend remaining physically active.  His goal is 30 minutes of aerobic exercise most days    Check laboratory testing as noted  - PSA (Prostatic-Specific Antigen), Annual Screen  - HIV Antigen/Antibody Screening Cascade  - Hepatitis C Antibody (Anti-HCV)    His colonoscopy is up-to-date from October of 2019.  He is due in 2029    A tetanus booster was given  Recommend considering the Shingrix/shingles vaccine    Recommend monitoring skin lesions for changes.  Encourage follow-up with dermatology    2. Other hyperlipidemia    Check a lipid cascade  Recommend improving dietary and lifestyle changes  - Basic Metabolic Panel  - Lipid Cascade    3. Scrotum pain  He was treated for a possible low-grade prostatitis with ciprofloxacin and Flomax previously  He may have a small right-sided epididymal cyst.  Consider epididymitis    Check an ultrasound of the scrotum  Consider further evaluation or treatment as warranted  If not improving then consider referral to urology    - US Scrotum and Testicles W Duplex Ltd; Future    4. Onychomycosis    Refilled Penlac  - ciclopirox (PENLAC) 8 % solution; Apply over nail and surrounding skin. Apply daily over previous coat. After seven (7) days, may remove with alcohol and continue cycle.  Dispense: 6.6 mL; Refill: 3    5. Hearing decreased, bilateral    Refer for audiology evaluation  Recommend protecting his hearing as well.  - Ambulatory referral to Audiology        Next follow up:  No follow-ups on  file.    Immunization Review  Adult Imm Review: Due today, orders placed  Documented tobacco use.  Website and phone contact for [employer] given to patient in AVS.    I discussed the following with the patient:   Adult Healthy Living: Importance of regular exercise  Healthy nutrition        Subjective:   Chief Complaint: Stan Maya is an 51 y.o. male here for a preventative health visit.    Patient has been advised of split billing requirements and indicates understanding: Yes  HPI: This is a pleasant 51-year-old male who presents to the clinic for a complete physical examination.    His medical history is notable for hyperlipidemia.  His last total cholesterol was 217 with triglycerides of 84, HDL of 56, and LDL of 144.    He had in early May 2021 he was seen for scrotum pain and also pain within the penile shaft.  Urinalysis was generally unremarkable.  He states that he can have some pain involving the right side of his scrotum on an intermittent basis.  This has shown some improvement but he still has discomfort.  Has burning with urination, urinary frequency, or decreased stream.    He does have a history of onychomycosis and has used Penlac.  He has had this spread to multiple toes.  He does not wish to consider an oral medication at this time and would like a referral for all of Penlac.    Additionally, he has noticed that his hearing seems diminished.  Multiple family members have noticed that he is experiencing some hearing issues.    He tries to remain physically active.  He has been waterskiing on occasion.  He has a few alcoholic beverages per week.  He does not smoke.    Remainder review of systems is negative.    Healthy Habits  Are you taking a daily aspirin? No  Do you typically exercising at least 40 min, 3-4 times per week?  NO  Do you usually eat at least 4 servings of fruit and vegetables a day, include whole grains and fiber and avoid regularly eating high fat foods? NO  Have you  "had an eye exam in the past two years? Yes  Do you see a dentist twice per year? Yes  Do you have any concerns regarding sleep? No    Safety Screen  If you own firearms, are they secured in a locked gun cabinet or with trigger locks? The patient does not own any firearms  Do you feel you are safe where you are living?: Yes (6/30/2021  7:13 AM)  Do you feel you are safe in your relationship(s)?: Yes (6/30/2021  7:13 AM)      Review of Systems:  Please see above.  The rest of the review of systems are negative for all systems.     Cancer Screening     Patient has no health maintenance due at this time          Patient Care Team:  Ignacio Castillo MD as PCP - General  Ignacio Castillo MD as Assigned PCP        History     Reviewed By Date/Time Sections Reviewed    Stefania Parker CMA 6/30/2021  7:13 AM Tobacco    Stefania Parker CMA 6/30/2021  7:12 AM Tobacco            Objective:   Vital Signs:   Visit Vitals  /71 (Patient Site: Left Arm, Patient Position: Sitting, Cuff Size: Adult Regular)   Pulse (!) 56   Temp 97.4  F (36.3  C) (Oral)   Resp 16   Ht 5' 10\" (1.778 m)   Wt 203 lb 9.6 oz (92.4 kg)   BMI 29.21 kg/m           PHYSICAL EXAM  General appearance: alert, appears stated age and cooperative  Head: Normocephalic, without obvious abnormality, atraumatic  Eyes: conjunctivae/corneas clear. PERRL, EOM's intact.   Ears: normal TM's and external ear canals both ears  Nose: Nares normal. Septum midline. Mucosa normal.  Throat: lips, mucosa, and tongue normal; teeth and gums normal  Neck: no adenopathy, supple, symmetrical, trachea midline   Lungs: clear to auscultation bilaterally  Heart: regular rate and rhythm, S1, S2 normal, no murmur, click, rub or gallop  Abdomen: soft, non-tender  Genitourinary: Penis is circumcised, there is a palpable lump inferior to the right testicle and some tenderness posterior to the right testicle in the area of the epididymis with possible fullness  No inguinal " hernia is evident  Rectal: Normal sphincter tone, prostate is smooth and symmetric  Extremities: extremities normal, atraumatic, no cyanosis or edema  He has thickening and yellowish discoloration of multiple toenails  Skin: Skin color, texture, turgor normal.  Lymph nodes: Cervical nodes normal.  Neurologic: Alert and oriented X 3.      The 10-year ASCVD risk score (Perlaxenia RESTREPO Jr., et al., 2013) is: 2.9%    Values used to calculate the score:      Age: 51 years      Sex: Male      Is Non- : No      Diabetic: No      Tobacco smoker: No      Systolic Blood Pressure: 106 mmHg      Is BP treated: No      HDL Cholesterol: 53 mg/dL      Total Cholesterol: 201 mg/dL         Medication List          Accurate as of June 30, 2021  4:39 PM. If you have any questions, ask your nurse or doctor.            CONTINUE taking these medications    ciclopirox 8 % solution  Also known as: Penlac  INSTRUCTIONS: Apply over nail and surrounding skin. Apply daily over previous coat. After seven (7) days, may remove with alcohol and continue cycle.  Reason for med: KNOWN/SUSPECTED INFECTION        ibuprofen 200 MG tablet  Also known as: ADVIL,MOTRIN  INSTRUCTIONS: Take 600 mg by mouth every 6 (six) hours as needed for pain.              Where to Get Your Medications      These medications were sent to [facility]/pharm[facility] #[id number] - [address] [address]    Phone: [phone]     ciclopirox 8 % solution         Additional Screenings Completed Today:

## 2021-07-04 NOTE — PATIENT INSTRUCTIONS - HE
Patient Instructions by Ignacio Castillo MD at 6/30/2021  7:00 AM     Author: Ignacio Castillo MD Service: -- Author Type: Physician    Filed: 6/30/2021  7:56 AM Encounter Date: 6/30/2021 Status: Signed    : Ignacio Castillo MD (Physician)       Remain physically active.  Her goal is 30 minutes of aerobic exercise most days  Your last total cholesterol was 217 with an LDL of 144.  Triglycerides were 84 and HDL was 56  Here are some tips regarding cholesterol:      Consume a diet that encourages vegetables, fruits, and whole grains  Dairy products should be low fat  Eat more poultry, fish, beans and nuts as a primary protein source  Limit sweets, sugar-sweetened beverages, and red meats  Use healthy oils like olive oil or canola oil for cooking  Limit high fat foods  Drink more water  Limit salt in your diet  Get plenty of aerobic physical activity    Follow-up for hearing evaluation as noted  Set up the scrotum ultrasound.  We can review over the phone when those results are available  I will mail you results to you    You received the tetanus booster.  Consider the Shingrix/shingles vaccine.  I recommend checking with insurance regarding coverage    Ignacio Castillo MD

## 2021-07-05 PROBLEM — E78.49 OTHER HYPERLIPIDEMIA: Status: ACTIVE | Noted: 2021-06-30

## 2021-07-06 VITALS
BODY MASS INDEX: 29.15 KG/M2 | HEART RATE: 56 BPM | DIASTOLIC BLOOD PRESSURE: 71 MMHG | HEIGHT: 70 IN | WEIGHT: 203.6 LBS | RESPIRATION RATE: 16 BRPM | SYSTOLIC BLOOD PRESSURE: 106 MMHG | TEMPERATURE: 97.4 F

## 2021-07-07 ENCOUNTER — HOSPITAL ENCOUNTER (OUTPATIENT)
Dept: ULTRASOUND IMAGING | Facility: HOSPITAL | Age: 51
Discharge: HOME | End: 2021-07-07
Attending: FAMILY MEDICINE
Payer: COMMERCIAL

## 2021-07-07 DIAGNOSIS — N50.82 SCROTUM PAIN: ICD-10-CM

## 2021-07-10 ENCOUNTER — COMMUNICATION - HEALTHEAST (OUTPATIENT)
Dept: FAMILY MEDICINE | Facility: CLINIC | Age: 51
End: 2021-07-10

## 2021-07-12 ENCOUNTER — TELEPHONE (OUTPATIENT)
Dept: FAMILY MEDICINE | Facility: CLINIC | Age: 51
End: 2021-07-12

## 2021-07-12 ENCOUNTER — TELEPHONE (OUTPATIENT)
Dept: AUDIOLOGY | Facility: CLINIC | Age: 51
End: 2021-07-12

## 2021-07-12 NOTE — LETTER
July 15, 2021      Stan Maay  [address] [facility] [address]    Jorge,    Here is a copy of your ultrasound report.  No significant abnormality was please let me know if you have questions or concerns.  Seen.  If you do have ongoing symptoms of concern I recommend follow-up.  If having symptoms concerning then you can be referred to urology.        Study Result    Narrative & Impression   EXAM: US SCROTUM AND TESTICLES WITH DUPLEX LIMITED  LOCATION: [facility]  DATE/TIME: 7/7/2021 4:32 PM     INDICATION: L testicular mass; status post vasectomy.  COMPARISON: None.  TECHNIQUE: Ultrasound of scrotum with color flow and spectral Doppler with waveform analysis performed.     FINDINGS:     RIGHT: Right testicle measures 5.1 x 3.2 x 2.0 cm. Normal testicle with no masses. Normal arterial duplex and normal color flow. Dilated tubular structures with low level internal echos consistent with epididymal efferent tubules and vas deferens   postvasectomy.     LEFT: Left testicle measures 5.4 x 2.8 x 2.3 cm. Normal testicle with no masses. Normal arterial duplex and normal color flow. Similar dilated tubular structures with low-level internal echoes slightly more conspicuous than the right consistent with   dilated epididymal efferent tubules and vas deferens post a cystectomy.     IMPRESSION:     1.  Tubular ectasia of the epididymides post with vasectomy which may correspond to the palpable abnormality.  2.  Normal-sized testes with normal echogenicity and intratesticular blood flow.         Please let me know if you have questions or concerns.      Sincerely,        Ignacio Castillo MD

## 2021-07-12 NOTE — TELEPHONE ENCOUNTER
Ignacio Castillo MD P [place], [facility] Care Team [place]  I attempted to call twice without being able to reach him.  I left a message.     Please let patient know that there were no concerning findings on his ultrasound of the scrotum.  It does show changes following his vasectomy.  Everything appears normal.       If he does have ongoing pain in the scrotum then he can be referred to urology for further evaluation and recommendations. Thank you.

## 2021-07-15 NOTE — TELEPHONE ENCOUNTER
Okay.    I dictated a letter to the patient.  Please mail the letter which discusses his scrotum ultrasound result.  You can then close the message.    Thank you.

## 2021-07-22 NOTE — LETTER
Letter by Ignacio Castillo MD at      Author: Ignacio Castillo MD Service: -- Author Type: --    Filed:  Encounter Date: 7/10/2021 Status: (Other)         Stan Maya  [address] [facility] [address]             July 10, 2021         Dear Mr. Maya,    Below are the results from your recent visit:    Resulted Orders   Basic Metabolic Panel   Result Value Ref Range    Sodium 141 136 - 145 mmol/L    Potassium 4.3 3.5 - 5.0 mmol/L    Chloride 107 98 - 107 mmol/L    CO2 23 22 - 31 mmol/L    Anion Gap, Calculation 11 5 - 18 mmol/L    Glucose 90 70 - 125 mg/dL    Calcium 9.1 8.5 - 10.5 mg/dL    BUN 15 8 - 22 mg/dL    Creatinine 1.05 0.70 - 1.30 mg/dL    GFR MDRD Af Amer >60 >60 mL/min/1.73m2    GFR MDRD Non Af Amer >60 >60 mL/min/1.73m2    Narrative    Fasting Glucose reference range is 70-99 mg/dL per  American Diabetes Association (ADA) guidelines.   Lipid Cascade   Result Value Ref Range    Cholesterol 201 (H) <=199 mg/dL    Triglycerides 106 <=149 mg/dL    HDL Cholesterol 53 >=40 mg/dL    LDL Calculated 127 <=129 mg/dL    Patient Fasting > 8hrs? Yes    PSA (Prostatic-Specific Antigen), Annual Screen   Result Value Ref Range    PSA 0.4 0.0 - 3.5 ng/mL    Narrative    Method is Abbott Prostate-Specific Antigen (PSA)  Standard-WHO 1st International (90:10)   HIV Antigen/Antibody Screening Cascade   Result Value Ref Range    HIV Antigen / Antibody Negative Negative    Narrative    Method is Abbott HIV Ag/Ab for the detection of HIV p24 antigen, HIV-1 antibodies and HIV-2 antibodies.   Hepatitis C Antibody (Anti-HCV)   Result Value Ref Range    Hepatitis C Ab Negative Negative       Jorge,    Your test results show that your cholesterol total remains borderline elevated.  Continue to work on your diet exercise as you are able.    Your prostate test is normal.  Your hepatitis C and HIV tests are negative.    Your kidney profile and blood sugar test are normal.    Please call  with questions or contact us using [employer]    Sincerely,        Electronically signed by Ignacio Castillo MD

## 2021-07-29 ENCOUNTER — OFFICE VISIT (OUTPATIENT)
Dept: AUDIOLOGY | Facility: CLINIC | Age: 51
End: 2021-07-29
Payer: COMMERCIAL

## 2021-07-29 DIAGNOSIS — H90.3 SENSORINEURAL HEARING LOSS, BILATERAL: Primary | ICD-10-CM

## 2021-07-29 PROCEDURE — 92550 TYMPANOMETRY & REFLEX THRESH: CPT

## 2021-07-29 PROCEDURE — 92557 COMPREHENSIVE HEARING TEST: CPT

## 2021-07-29 NOTE — PROGRESS NOTES
AUDIOLOGY REPORT    SUBJECTIVE:  Stan Maya is a 51 year old male who was seen in the Audiology Clinic at the [facility] for audiologic evaluation, referred by Ignacio Castillo M.D.. The patient has not been seen previously in this clinic for assessment. The patient reports more difficulty hearing the television, when in larger rooms/with an echo, and notes that his family feels he is having more trouble. He reports slight aural fullness bilaterally. Stan denies otalgia, otorrhea, tinnitus, dizziness, history of ear surgeries, history of noise exposure, or family history of hearing loss. His medical history is significant for allergies. The patient notes difficulty with communication in a variety of listening situations as mentioned previously. They were not accompanied by anyone to today's appointment.     OBJECTIVE: Otoscopic exam indicates ears are clear of cerumen bilaterally.      Pure Tone Thresholds assessed using conventional audiometry with good  reliability from 250-8000 Hz bilaterally using insert earphones and circumaural headphones     RIGHT:  normal sloping to moderate-severe sensorineural hearing loss    LEFT:    normal sloping to moderate sensorineural hearing loss    Tympanogram:    RIGHT: normal eardrum mobility    LEFT:   normal eardrum mobility    Reflexes (reported by stimulus ear):  RIGHT: Ipsilateral is present at normal levels  RIGHT: Contralateral is absent at frequencies tested  LEFT:   Ipsilateral is present at elevated levels  LEFT:   Contralateral is absent at frequencies tested      Speech Reception Threshold:    RIGHT: 25 dB HL    LEFT:   25 dB HL  Word Recognition Score:     RIGHT: 96% at 65 dB HL using NU-6 recorded word list.    LEFT:   96% at 65 dB HL using NU-6 recorded word list.    Discussed various causes of hearing loss and its impact on communication. Briefly discussed hearing aid options. Also briefly reviewed hearing aid coverage  and insurance possibilities including: no coverage, partial coverage, or coverage through a third party. Stan reports that he will be changing insurances to a [employer] plan in the next few days. Following this, he ill contact them regarding possible heaing aid coverage. He was provided a copy of today's results and a handout of the familiar sounds audiograms per his request.      ASSESSMENT: Today's evaluation indicated bilateral sensorineural hearing loss. There are no previous audiograms available for comparison. Today s results were discussed with the patient in detail.     PLAN:  Patient was counseled regarding hearing loss and impact on communication.  Patient is a good candidate for amplification at this time, pending on motivation. It is recommended that the patient consider a trial with amplification if desired. Stan should return recheck hearing if changes are noted or concerns arise.  Please call this clinic with questions regarding these results or recommendations.      Kvng Shahid. CCC-A  Licensed Audiologist   [place] #[id number]

## 2021-10-16 ENCOUNTER — HEALTH MAINTENANCE LETTER (OUTPATIENT)
Age: 51
End: 2021-10-16

## 2022-09-25 ENCOUNTER — HEALTH MAINTENANCE LETTER (OUTPATIENT)
Age: 52
End: 2022-09-25

## 2022-12-05 ENCOUNTER — OFFICE VISIT (OUTPATIENT)
Dept: FAMILY MEDICINE | Facility: CLINIC | Age: 52
End: 2022-12-05
Payer: COMMERCIAL

## 2022-12-05 ENCOUNTER — TELEPHONE (OUTPATIENT)
Dept: UROLOGY | Facility: CLINIC | Age: 52
End: 2022-12-05

## 2022-12-05 VITALS
BODY MASS INDEX: 28.52 KG/M2 | SYSTOLIC BLOOD PRESSURE: 115 MMHG | WEIGHT: 199.2 LBS | HEART RATE: 63 BPM | HEIGHT: 70 IN | OXYGEN SATURATION: 100 % | RESPIRATION RATE: 16 BRPM | DIASTOLIC BLOOD PRESSURE: 78 MMHG | TEMPERATURE: 97.8 F

## 2022-12-05 DIAGNOSIS — N20.0 NEPHROLITHIASIS: Primary | ICD-10-CM

## 2022-12-05 PROCEDURE — 0124A COVID-19 VACCINE BIVALENT BOOSTER 12+ (PFIZER): CPT | Performed by: FAMILY MEDICINE

## 2022-12-05 PROCEDURE — 90471 IMMUNIZATION ADMIN: CPT | Performed by: FAMILY MEDICINE

## 2022-12-05 PROCEDURE — 99213 OFFICE O/P EST LOW 20 MIN: CPT | Mod: 25 | Performed by: FAMILY MEDICINE

## 2022-12-05 PROCEDURE — 91312 COVID-19 VACCINE BIVALENT BOOSTER 12+ (PFIZER): CPT | Performed by: FAMILY MEDICINE

## 2022-12-05 PROCEDURE — 90682 RIV4 VACC RECOMBINANT DNA IM: CPT | Performed by: FAMILY MEDICINE

## 2022-12-05 ASSESSMENT — PAIN SCALES - GENERAL: PAINLEVEL: MODERATE PAIN (4)

## 2022-12-05 NOTE — PROGRESS NOTES
Assessment & Plan     Nephrolithiasis    He continues to have upper abdominal pain that radiates in the back for this may be secondary to his kidney stones though they are not obstructing at this time  Reviewed signs and symptoms of dyspepsia  A CT scan was negative for other acute findings  His colonoscopy is up-to-date from 2019.  This was normal.    Reviewed the CT scan which was done at the [facility] and it shows a nonobstructing kidney stone in each kidney  Recommend increased fluids  Given ongoing discomfort in the upper abdomen that radiates to his back will refer to urology to the [facility]  Recommend immediate follow-up if there are concerns    - Adult Uro/Gyn  Referral    Healthcare maintenance    COVID and flu shot were given today              No follow-ups on file.    Ignacio Castillo MD  [facility]    Tarik Pierce is a 52 year old, presenting for the following health issues:  Follow Up (Was seen at the [facility] room on Friday diagnosed with kidney stones )    This is a 52-year-old male who presents to clinic in follow-up after evaluation at the [facility] for abdominal pain.  Over the past several weeks he has had pain involving the upper abdomen area.  This was more prominent in the right side and right flank area more recently.  However, he can have some discomfort on the left side as well.  He describes an aching discomfort in his back.  At times he has had discomfort that radiates to his shoulders.  He therefore had an emergent evaluation and a CT scan showed nonobstructing kidney stones including a 3-4 mm stone in the left mid kidney and a 2 mm stone in the right kidney.  There was no evidence of hydronephrosis.  He had microscopic hematuria.  He denies fever or chills.  He has not had nausea or vomiting.  He is up-to-date on his colonoscopy and had a normal colonoscopy in October 2019.    He follows up today noting he  "continues to have some discomfort.  He has not been jaundiced.    Family history is notable for father with kidney stones.      History of Present Illness       Reason for visit:  Kidney stones  Symptom onset:  3-4 weeks ago    He eats 2-3 servings of fruits and vegetables daily.He consumes 1 sweetened beverage(s) daily.He exercises with enough effort to increase his heart rate 9 or less minutes per day.  He exercises with enough effort to increase his heart rate 3 or less days per week.   He is taking medications regularly.             Review of Systems         Objective    /78 (BP Location: Left arm, Patient Position: Sitting, Cuff Size: Adult Regular)   Pulse 63   Temp 97.8  F (36.6  C) (Oral)   Resp 16   Ht 1.778 m (5' 10\")   Wt 90.4 kg (199 lb 3.2 oz)   SpO2 100%   BMI 28.58 kg/m    Body mass index is 28.58 kg/m .  Physical Exam   GENERAL: healthy, alert and no distress  EYES: Eyes grossly normal to inspection, PERRL and conjunctivae and sclerae normal  NECK: no adenopathy, no asymmetry, masses, or scars and thyroid normal to palpation  RESP: lungs clear to auscultation - no rales, rhonchi or wheezes  CV: regular rate and rhythm, normal S1 S2, no S3 or S4, no murmur, click or rub, no peripheral edema and peripheral pulses strong  ABDOMEN: soft, nontender  MS: no gross musculoskeletal defects noted, no edema  SKIN: no suspicious lesions or rashes  NEURO: Normal strength and tone, mentation intact and speech normal  PSYCH: mentation appears normal, affect normal/bright                    "

## 2022-12-05 NOTE — PATIENT INSTRUCTIONS
Jorge,    I recommend follow-up with her kidney stone clinic as the next up  Continue to drink plenty of liquids  Please follow-up in the clinic if having any ongoing symptoms of concern    Ignacio Castillo MD

## 2022-12-08 ENCOUNTER — VIRTUAL VISIT (OUTPATIENT)
Dept: UROLOGY | Facility: CLINIC | Age: 52
End: 2022-12-08
Payer: COMMERCIAL

## 2022-12-08 DIAGNOSIS — N20.0 NEPHROLITHIASIS: ICD-10-CM

## 2022-12-08 DIAGNOSIS — N20.0 CALCULUS OF KIDNEY: Primary | ICD-10-CM

## 2022-12-08 PROCEDURE — 99203 OFFICE O/P NEW LOW 30 MIN: CPT | Mod: 95 | Performed by: UROLOGY

## 2022-12-08 ASSESSMENT — PAIN SCALES - GENERAL: PAINLEVEL: NO PAIN (0)

## 2022-12-08 NOTE — PATIENT INSTRUCTIONS
Patient Stated Goal: Prevent further stones  Calcium Oxalate Stone Prevention Self Management    Drink more fluids:    Drinking more liquids is the best way you can help prevent future stones. Stones can form when substances in the urine are too concentrated. The more you drink, the more urine you will make. This means all substances in the urine will be less concentrated.    How much urine should I be producing?    The usual recommended daily urine production is about 2 to 3 quarts (8241-9084 ml). If you are producing more than 3 quarts of urine on a regular basis, it is possible to deplete important minerals stored in the body.    To measure the amount of urine you produce in a day, you can either:    Collect all urine in a container and measure at the end of the day     Use a measuring cup each time you urinate and add up the amounts at the end of the day     Observe    Color - Dark sheryl urine is concentrated. Light straw color or lighter is dilute and desirable     Odor - Concentrated urine tends to smell stronger. Dilute urine is nearly odorless    Ways to increase your fluid intake    Increasing the amount of fluid you drink is effective for all types of kidney stones. While water is commonly recommended, all fluids are effective for increasing the amount of urine your body produces.    Focus on starting a lifelong habit, rather than a short-term solution.     Keep liquids on hand that you like. Crystal Light is a low calorie appropriate choice.    Drink out of larger glasses. You'll tend to drink more with each serving.     Have an additional glass of fluid with each meal.     Keep a water or drink bottle at work and fill it regularly.     *If you are prone to fluid retention, consult your doctor before making changes to your fluid habits.    Low Oxalate Diet:    Avoid excess amounts or daily consumption of these foods:    All nuts and nut products including peanuts, almonds, pecans, peanut butter, almond  milk    Rhubarb    Chocolate    Soybeans and soy products     Spinach    Wheat Germ    Beets    Maintain a normal calcium diet:    Researches have found that people with low calcium intakes tend to have more stones. Foods with high calcium content are acceptable and include:    Dairy products (including milk, cheese and yogurt)    Meat and fish    Enriched cereals    Dark green vegetables    What about calcium supplements?     Many people take calcium supplements, either on their own or as prescribed by a doctor. Research has indicated that calcium supplements do not usually pose a risk for stone formation.  Calcium citrate is a better choice for a supplement.    Avoid excess salt:    Salt (sodium chloride) is found in abundance in many foods. High sodium levels in the urine can interfere with the kidney's handling of calcium.     Tips for reducing the salt in your diet:    Don't use salt at the table    Reduce the salt used in food preparation. Try 1/2 teaspoon when recipes call for 1 teaspoon.    Use herbs and spices for flavoring instead of salt.    Avoid salty foods.    Check the label before you buy or use a product. Note sodium and portion size information.    Try to consume less than 2,000 mg/day. (1 teaspoon = 2,000 mg)    Foods with high sodium content include:    Processed meat (including luncheon meats, sausage)     Crackers     Instant cereal     Processed cheese     Canned soups     Chips and snack foods     Soy sauce    [facility] can respond to your questions or concerns 24 hours a day at [phone].

## 2022-12-08 NOTE — PROGRESS NOTES
Assessment/Plan:    Assessment & Plan   Stan was seen today for new patient.    Diagnoses and all orders for this visit:    Calculus of kidney  -     Patient Stated Goal: Prevent further stones    Nephrolithiasis  -     Adult Uro/Gyn  Referral        Stone Management Plan  Stone Management 12/8/2022   Urinary Tract Infection No suspicion of infection   Renal Colic Asymptomatic at this time   Renal Failure No suspicion of renal failure   Current CT date 12/2/2022   Right sided stones? Yes   R Number of ureteral stones No ureteral stones   R Number of kidney stones  1   R GSD of kidney stones < 2   R Hydronephrosis None   R Stone Event No current event   R Current Plan Observe   Observe rationale Limited stone burden with good prognosis for spontaneous passage   Left sided stones? Yes   L Number of ureteral stones No ureteral stones   L Number of kidney stones  1   L GSD of kidney stones 2 - 4   L Hydronephrosis None   L Stone Event No current event   L Current Plan Observe   Observe rationale Limited stone burden with good prognosis for spontaneous passage             PLAN    Vague flank abdominal pain and small renal stones. Will observe for now. If develops into more consistent pattern, could consider left stone clearance.    Over the counter symptom control medications of ibuprofen and Tylenol were recommended.    Video call duration: 10 minutes  Distant site (provider site): Clinic  15 minutes spent on the date of the encounter doing chart review, history and exam, documentation and further activities per the note    CORBY PAN MD  [facility]    Subjective:     HPI  . Stan Maya is a 52 year old  male who is being evaluated via a billable video visit by [facility] stone consultation.    He is a first time unidentified composition stone former who has not required stone clearance procedures. He has  not previously participated in stone risk evaluation. He has no identified modifiable stone risk factors. He has identified non-modifiable stone risks including:  limited family history and bilateral stones.    He has been having vague bilateral flank abdominal discomfort for a few weeks. No obvious precipitants. Workup to this point has only found small peripheral renal stones. His father has a history of stones and has required procedures to clear.    CT scan is personally reviewed and demonstrates a <2 mm right renal stone and a 3-4 mm left mid-upper pole renal stone. No hydronephrosis.    Significant labs from presentation include mild hematuria.    ROS   Review of systems is negative except for HPI    No past medical history on file.    Past Surgical History:   Procedure Laterality Date     OTHER SURGICAL HISTORY      inguinal hernia     VASECTOMY         Current Outpatient Medications   Medication Sig Dispense Refill     ibuprofen (ADVIL,MOTRIN) 200 MG tablet [IBUPROFEN (ADVIL,MOTRIN) 200 MG TABLET] Take 600 mg by mouth every 6 (six) hours as needed for pain.         Allergies   Allergen Reactions     Penicillins Rash       Social History     Socioeconomic History     Marital status:      Spouse name: Not on file     Number of children: Not on file     Years of education: Not on file     Highest education level: Not on file   Occupational History     Not on file   Tobacco Use     Smoking status: Never     Smokeless tobacco: Never   Substance and Sexual Activity     Alcohol use: Yes     Comment: Alcoholic Drinks/day: Occasional     Drug use: No     Sexual activity: Not on file   Other Topics Concern     Not on file   Social History Narrative     Not on file     Social Determinants of Health     Financial Resource Strain: Not on file   Food Insecurity: Not on file   Transportation Needs: Not on file   Physical Activity: Not on file   Stress: Not on file   Social Connections: Not on file   Intimate Partner  Violence: Not on file   Housing Stability: Not on file       Family History   Problem Relation Age of Onset     Pacemaker Father      Heart Disease Maternal Grandfather      Cerebrovascular Disease Maternal Grandfather      Liver Disease Paternal Grandfather      Alcoholism Paternal Grandfather        Objective:     No vitals or physical exam obtained due to virtual visit    LABS  Most Recent 3 CBC's:No lab results found.  Most Recent 3 BMP's:Recent Labs   Lab Test 06/30/21  0806 08/16/19  0827     --    POTASSIUM 4.3  --    CHLORIDE 107  --    CO2 23  --    BUN 15  --    CR 1.05  --    ANIONGAP 11  --    RANDI 9.1  --    GLC 90 85     Most Recent Urinalysis:Recent Labs   Lab Test 05/01/21  1007   COLOR Yellow   APPEARANCE Clear   URINEGLC Negative   URINEBILI Negative   URINEKETONE Negative   SG 1.020   UBLD Trace*   URINEPH 7.5   PROTEIN Negative   UROBILINOGEN 0.2 E.U./dL   NITRITE Negative   LEUKEST Negative   RBCU 3-5*   WBCU 0-5     Acute Labs Urine Culture  No results found for: CULTURE

## 2022-12-08 NOTE — PROGRESS NOTES
Patient is roomed via telephone for a virtual visit.  Patient confirmed he is in the [facility] at the time of this appointment.  Patient understands that this visit is billable and agree to proceed with appointment.

## 2023-09-22 ENCOUNTER — TRANSFERRED RECORDS (OUTPATIENT)
Dept: HEALTH INFORMATION MANAGEMENT | Facility: CLINIC | Age: 53
End: 2023-09-22
Payer: COMMERCIAL

## 2023-10-09 DIAGNOSIS — B35.1 ONYCHOMYCOSIS: Primary | ICD-10-CM

## 2023-10-10 RX ORDER — CICLOPIROX 80 MG/ML
SOLUTION TOPICAL
Qty: 6.6 ML | Refills: 0 | Status: SHIPPED | OUTPATIENT
Start: 2023-10-10

## 2023-10-14 ENCOUNTER — HEALTH MAINTENANCE LETTER (OUTPATIENT)
Age: 53
End: 2023-10-14

## 2024-12-07 ENCOUNTER — HEALTH MAINTENANCE LETTER (OUTPATIENT)
Age: 54
End: 2024-12-07

## 2025-04-28 SDOH — HEALTH STABILITY: PHYSICAL HEALTH: ON AVERAGE, HOW MANY MINUTES DO YOU ENGAGE IN EXERCISE AT THIS LEVEL?: 30 MIN

## 2025-04-28 SDOH — HEALTH STABILITY: PHYSICAL HEALTH: ON AVERAGE, HOW MANY DAYS PER WEEK DO YOU ENGAGE IN MODERATE TO STRENUOUS EXERCISE (LIKE A BRISK WALK)?: 2 DAYS

## 2025-04-28 ASSESSMENT — SOCIAL DETERMINANTS OF HEALTH (SDOH): HOW OFTEN DO YOU GET TOGETHER WITH FRIENDS OR RELATIVES?: ONCE A WEEK

## 2025-04-29 ENCOUNTER — OFFICE VISIT (OUTPATIENT)
Dept: FAMILY MEDICINE | Facility: CLINIC | Age: 55
End: 2025-04-29
Payer: COMMERCIAL

## 2025-04-29 VITALS
SYSTOLIC BLOOD PRESSURE: 112 MMHG | RESPIRATION RATE: 16 BRPM | OXYGEN SATURATION: 100 % | TEMPERATURE: 97.5 F | DIASTOLIC BLOOD PRESSURE: 78 MMHG | BODY MASS INDEX: 28.77 KG/M2 | WEIGHT: 201 LBS | HEART RATE: 51 BPM | HEIGHT: 70 IN

## 2025-04-29 DIAGNOSIS — Z12.5 SCREENING FOR PROSTATE CANCER: ICD-10-CM

## 2025-04-29 DIAGNOSIS — R93.89 ABNORMAL ULTRASOUND: ICD-10-CM

## 2025-04-29 DIAGNOSIS — Z13.1 SCREENING FOR DIABETES MELLITUS: ICD-10-CM

## 2025-04-29 DIAGNOSIS — N20.0 NEPHROLITHIASIS: ICD-10-CM

## 2025-04-29 DIAGNOSIS — Z00.00 ROUTINE GENERAL MEDICAL EXAMINATION AT A HEALTH CARE FACILITY: Primary | ICD-10-CM

## 2025-04-29 DIAGNOSIS — E78.00 HYPERCHOLESTEROLEMIA: ICD-10-CM

## 2025-04-29 LAB
ANION GAP SERPL CALCULATED.3IONS-SCNC: 10 MMOL/L (ref 7–15)
BUN SERPL-MCNC: 11.5 MG/DL (ref 6–20)
CALCIUM SERPL-MCNC: 9.6 MG/DL (ref 8.8–10.4)
CHLORIDE SERPL-SCNC: 103 MMOL/L (ref 98–107)
CHOLEST SERPL-MCNC: 209 MG/DL
CREAT SERPL-MCNC: 1.06 MG/DL (ref 0.67–1.17)
EGFRCR SERPLBLD CKD-EPI 2021: 82 ML/MIN/1.73M2
FASTING STATUS PATIENT QL REPORTED: YES
FASTING STATUS PATIENT QL REPORTED: YES
GLUCOSE SERPL-MCNC: 97 MG/DL (ref 70–99)
HCO3 SERPL-SCNC: 24 MMOL/L (ref 22–29)
HDLC SERPL-MCNC: 61 MG/DL
LDLC SERPL CALC-MCNC: 123 MG/DL
NONHDLC SERPL-MCNC: 148 MG/DL
POTASSIUM SERPL-SCNC: 4.5 MMOL/L (ref 3.4–5.3)
PSA SERPL DL<=0.01 NG/ML-MCNC: 0.49 NG/ML (ref 0–3.5)
SODIUM SERPL-SCNC: 137 MMOL/L (ref 135–145)
TRIGL SERPL-MCNC: 123 MG/DL

## 2025-04-29 PROCEDURE — 90750 HZV VACC RECOMBINANT IM: CPT | Performed by: FAMILY MEDICINE

## 2025-04-29 PROCEDURE — 36415 COLL VENOUS BLD VENIPUNCTURE: CPT | Performed by: FAMILY MEDICINE

## 2025-04-29 PROCEDURE — 80048 BASIC METABOLIC PNL TOTAL CA: CPT | Performed by: FAMILY MEDICINE

## 2025-04-29 PROCEDURE — 99396 PREV VISIT EST AGE 40-64: CPT | Mod: 25 | Performed by: FAMILY MEDICINE

## 2025-04-29 PROCEDURE — 3078F DIAST BP <80 MM HG: CPT | Performed by: FAMILY MEDICINE

## 2025-04-29 PROCEDURE — 90471 IMMUNIZATION ADMIN: CPT | Performed by: FAMILY MEDICINE

## 2025-04-29 PROCEDURE — 80061 LIPID PANEL: CPT | Performed by: FAMILY MEDICINE

## 2025-04-29 PROCEDURE — 3074F SYST BP LT 130 MM HG: CPT | Performed by: FAMILY MEDICINE

## 2025-04-29 PROCEDURE — G0103 PSA SCREENING: HCPCS | Performed by: FAMILY MEDICINE

## 2025-04-29 PROCEDURE — 99213 OFFICE O/P EST LOW 20 MIN: CPT | Mod: 25 | Performed by: FAMILY MEDICINE

## 2025-04-29 NOTE — PATIENT INSTRUCTIONS
Patient Education     Jorge,    You received a shingles vaccine today.  A booster is due in 2-6 months  We will check your laboratory testing today  You may set up your ultrasound of the scrotum  If there are abnormalities I will recommend that you follow-up with a urologist  Your colonoscopy is up-to-date until October of 2029  I recommend follow-up with dermatology for skin check    Ignacio Castillo MD      Preventive Care Advice   This is general advice given by our system to help you stay healthy. However, your care team may have specific advice just for you. Please talk to your care team about your preventive care needs.  Nutrition  Eat 5 or more servings of fruits and vegetables each day.  Try wheat bread, brown rice and whole grain pasta (instead of white bread, rice, and pasta).  Get enough calcium and vitamin D. Check the label on foods and aim for 100% of the RDA (recommended daily allowance).  Lifestyle  Exercise at least 150 minutes each week  (30 minutes a day, 5 days a week).  Do muscle strengthening activities 2 days a week. These help control your weight and prevent disease.  No smoking.  Wear sunscreen to prevent skin cancer.  Have a dental exam and cleaning every 6 months.  Yearly exams  See your health care team every year to talk about:  Any changes in your health.  Any medicines your care team has prescribed.  Preventive care, family planning, and ways to prevent chronic diseases.  Shots (vaccines)   HPV shots (up to age 26), if you've never had them before.  Hepatitis B shots (up to age 59), if you've never had them before.  COVID-19 shot: Get this shot when it's due.  Flu shot: Get a flu shot every year.  Tetanus shot: Get a tetanus shot every 10 years.  Pneumococcal, hepatitis A, and RSV shots: Ask your care team if you need these based on your risk.  Shingles shot (for age 50 and up)  General health tests  Diabetes screening:  Starting at age 35, Get screened for diabetes at least every 3  years.  If you are younger than age 35, ask your care team if you should be screened for diabetes.  Cholesterol test: At age 39, start having a cholesterol test every 5 years, or more often if advised.  Bone density scan (DEXA): At age 50, ask your care team if you should have this scan for osteoporosis (brittle bones).    Cancer screening tests  Cervical cancer screening: If you have a cervix, begin getting regular cervical cancer screening tests starting at age 21.  Breast cancer scan (mammogram): If you've ever had breasts, begin having regular mammograms starting at age 40. This is a scan to check for breast cancer.  Colon cancer screening: It is important to start screening for colon cancer at age 45.  Have a colonoscopy test every 10 years (or more often if you're at risk) Or, ask your provider about stool tests like a FIT test every year or Cologuard test every 3 years.  To learn more about your testing options, visit:   .  For help making a decision, visit:   https://bit.ly/ch11831.  Prostate cancer screening test: If you have a prostate, ask your care team if a prostate cancer screening test (PSA) at age 55 is right for you.  Lung cancer screening: If you are a current or former smoker ages 50 to 80, ask your care team if ongoing lung cancer screenings are right for you.  For informational purposes only. Not to replace the advice of your health care provider. Copyright   2023 [email or web address]. All rights reserved. Clinically reviewed by the [facility] Transitions Program. [employer] [id number] - REV 01/24.

## 2025-04-29 NOTE — PROGRESS NOTES
Preventive Care Visit  [facility]  Ignacio Castillo MD, Family Medicine  Apr 29, 2025  {Provider  Link to SmartSet :041729}    {PROVIDER CHARTING PREFERENCE:605226}    Tarik Pierce is a 55 year old, presenting for the following:  Physical        4/29/2025     7:58 AM   Additional Questions   Roomed by Stefania MARTEL CMA          HPI  ***   {MA/LPN/RN Pre-Provider Visit Orders- hCG/UA/Strep (Optional):834540}  {SUPERLIST (Optional):248648}  {additonal problems for provider to add (Optional):732802}  Advance Care Planning  {The storyboard will display whether the patient has ACP docs on file. Hover over the Code section in the storyboard to access the ACP documents. :247327}  Patient states has Health Care Directive and will send to [facility] [employer].        4/28/2025   General Health   How would you rate your overall physical health? Good   Feel stress (tense, anxious, or unable to sleep) Only a little   (!) STRESS CONCERN      4/28/2025   Nutrition   Three or more servings of calcium each day? Yes   Diet: Regular (no restrictions)   How many servings of fruit and vegetables per day? (!) 0-1   How many sweetened beverages each day? 0-1         4/28/2025   Exercise   Days per week of moderate/strenous exercise 2 days   Average minutes spent exercising at this level 30 min   (!) EXERCISE CONCERN      4/28/2025   Social Factors   Frequency of gathering with friends or relatives Once a week   Worry food won't last until get money to buy more No   Food not last or not have enough money for food? No   Do you have housing? (Housing is defined as stable permanent housing and does not include staying outside in a car, in a tent, in an abandoned building, in an overnight shelter, or couch-surfing.) Yes   Are you worried about losing your housing? No   Lack of transportation? No   Unable to get utilities (heat,electricity)? Yes   Want help with housing or utility concern? No   (!) FINANCIAL  RESOURCE STRAIN CONCERN      4/28/2025   Fall Risk   Fallen 2 or more times in the past year? No   Trouble with walking or balance? No          4/28/2025   Dental   Dentist two times every year? Yes         Today's PHQ-2 Score:       4/28/2025     2:47 PM   PHQ-2 ( 1999 [employer])   Q1: Little interest or pleasure in doing things 0   Q2: Feeling down, depressed or hopeless 0   PHQ-2 Score 0    Q1: Little interest or pleasure in doing things Not at all   Q2: Feeling down, depressed or hopeless Not at all   PHQ-2 Score 0       Patient-reported           4/28/2025   Substance Use   Alcohol more than 3/day or more than 7/wk No   Do you use any other substances recreationally? No     Social History     Tobacco Use    Smoking status: Never    Smokeless tobacco: Never   Substance Use Topics    Alcohol use: Yes     Comment: Alcoholic Drinks/day: Occasional    Drug use: No     {Provider  If there are gaps in the social history shown above, please follow the link to update and then refresh the note Link to Social and Substance History :950931}      4/28/2025   STI Screening   New sexual partner(s) since last STI/HIV test? No   Last PSA:   Prostate Specific Antigen Screen   Date Value Ref Range Status   06/30/2021 0.4 0.0 - 3.5 ng/mL Final     ASCVD Risk   The 10-year ASCVD risk score (Maria Victoria TREJO, et al., 2019) is: 4.7%    Values used to calculate the score:      Age: 55 years      Sex: Male      Is Non- : No      Diabetic: No      Tobacco smoker: No      Systolic Blood Pressure: 112 mmHg      Is BP treated: No      HDL Cholesterol: 53 mg/dL      Total Cholesterol: 201 mg/dL    {Link to Fracture Risk Assessment Tool (Optional):030349}    {Provider  REQUIRED FOR AWV Use the storyboard to review patient history, after sections have been marked as reviewed, refresh note to capture documentation:415962}   Reviewed and updated as needed this visit by Provider                    {HISTORY OPTIONS  "(Optional):667765}    {ROS Picklists (Optional):412437}     Objective    Exam  /78 (BP Location: Left arm, Patient Position: Sitting, Cuff Size: Adult Regular)   Pulse 51   Temp 97.5  F (36.4  C) (Oral)   Resp 16   Ht 1.778 m (5' 10\")   Wt 91.2 kg (201 lb)   SpO2 100%   BMI 28.84 kg/m     Estimated body mass index is 28.84 kg/m  as calculated from the following:    Height as of this encounter: 1.778 m (5' 10\").    Weight as of this encounter: 91.2 kg (201 lb).    Physical Exam  {Exam Choices (Optional):470388}        Signed Electronically by: Ignacio Castillo MD  {Email feedback regarding this note to [email or web address]   :444211}  " "Left arm, Patient Position: Sitting, Cuff Size: Adult Regular)   Pulse 51   Temp 97.5  F (36.4  C) (Oral)   Resp 16   Ht 1.778 m (5' 10\")   Wt 91.2 kg (201 lb)   SpO2 100%   BMI 28.84 kg/m     Estimated body mass index is 28.84 kg/m  as calculated from the following:    Height as of this encounter: 1.778 m (5' 10\").    Weight as of this encounter: 91.2 kg (201 lb).    Physical Exam  GENERAL: alert and no distress  EYES: Eyes grossly normal to inspection, PERRL and conjunctivae and sclerae normal  HENT: ear canals and TM's normal, nose and mouth without ulcers or lesions  NECK: no adenopathy, no asymmetry, masses, or scars  RESP: lungs clear to auscultation - no rales, rhonchi or wheezes  CV: regular rate and rhythm, normal S1 S2, no S3 or S4, no murmur, click or rub, no peripheral edema  ABDOMEN: soft, nontender   (male): normal male genitalia without lesions or urethral discharge, no hernia  MS: no gross musculoskeletal defects noted, no edema  PSYCH: mentation appears normal, affect normal/bright    Radiology:    FINDINGS:     RIGHT: Right testicle measures 5.5 x 2.8 x 2.5 cm. Normal testicle with no masses. Normal arterial duplex and normal color flow. Tiny epididymal head cyst, otherwise normal epididymis. No hydrocele. No varicocele.     LEFT: Left testicle measures 5.2 x 2.5 x 2.3 cm. There is a 1.6 x 1.3 x 1.6 cm heterogeneously hypoechoic area within the inferior aspect of the testicle which does not have associated color Doppler flow. The remainder of the testicle demonstrates color Doppler flow with normal arterial and venous spectral Doppler waveforms. No discrete lesion. Tiny epididymal head cyst in an otherwise normal epididymis. No hydrocele. No varicocele.     Signed Electronically by: Ignacio Castillo MD    "